# Patient Record
Sex: FEMALE
[De-identification: names, ages, dates, MRNs, and addresses within clinical notes are randomized per-mention and may not be internally consistent; named-entity substitution may affect disease eponyms.]

---

## 2017-07-29 ENCOUNTER — HOSPITAL ENCOUNTER (EMERGENCY)
Dept: HOSPITAL 14 - H.ER | Age: 37
Discharge: HOME | End: 2017-07-29
Payer: MEDICAID

## 2017-07-29 VITALS — SYSTOLIC BLOOD PRESSURE: 132 MMHG | DIASTOLIC BLOOD PRESSURE: 74 MMHG | RESPIRATION RATE: 20 BRPM | HEART RATE: 92 BPM

## 2017-07-29 VITALS — TEMPERATURE: 97.6 F | OXYGEN SATURATION: 100 %

## 2017-07-29 DIAGNOSIS — N76.0: Primary | ICD-10-CM

## 2017-07-29 NOTE — ED PDOC
HPI: Female  Pain


Chief Complaint (Nursing): Female Genitourinary


Chief Complaint (Provider): vaginal discharge


History Per: Patient


History/Exam Limitations: no limitations


Onset/Duration Of Symptoms: Days (7)


Current Symptoms Are (Timing): Still Present


Additional History Per: Patient


Additional Complaint(s): 





37 year old female presents with complaints of recurrent vaginal discharge that 

has fishy odor. Vaginal discharge has fishy odor. She states she has been 

treated multiple times for same. Denies any dysuria, hematuria, dyspareunia. 

Seen at 2 other urgent care facilities and given antibiotics with recurrence. 


She has two male partners and does not use protection because of a reaction to '

latex.' 


She denies hx of any STIs. 





Past Medical History


Vital Signs: 





 Last Vital Signs











Temp  97.6 F   17 13:43


 


Pulse  118 H  17 13:43


 


Resp  16   17 13:43


 


BP  137/92 H  17 13:43


 


Pulse Ox  100   17 13:43














- Medical History


PMH: No Chronic Diseases





- Surgical History


Surgical History: 


Other surgeries: cardiac ablation





- Family History


Family History: States: Unknown Family Hx





- Home Medications


Home Medications: 


 Ambulatory Orders











 Medication  Instructions  Recorded


 


Metronidazole [Flagyl] 500 mg PO Q12 7 Days 17














- Allergies


Allergies/Adverse Reactions: 


 Allergies











Allergy/AdvReac Type Severity Reaction Status Date / Time


 


No Known Allergies Allergy   Verified 10/16/14 17:46














Review of Systems


Constitutional: Negative for: Fever, Sweats


Eyes: Negative for: Vision Change


ENT: Negative for: Ear Discharge, Nose Discharge


Cardiovascular: Negative for: Chest Pain


Respiratory: Negative for: Cough, Shortness of Breath


Gastrointestinal: Negative for: Nausea, Vomiting, Abdominal Pain


Genitourinary Female: Positive for: Vaginal Discharge (fishy odor).  Negative 

for: Dysuria, Frequency, Incontinence, Pelvic Pain


Skin: Negative for: Rash





Physical Exam





- Physical Exam


Appears: Positive for: No Acute Distress


Gastrointestinal/Abdominal: Positive for: Soft.  Negative for: Tenderness, 

Distended


Pelvic Exam: Positive for: External Exam Normal, Speculum Exam Normal, Bimanual 

Exam Normal, No Cerv. Motion Tender, Discharge (small amount of white discharge

, no odor).  Negative for: No Masses, Active Bleeding


Extremity: Negative for: Pedal Edema


Neurologic/Psych: Positive for: Alert, CNs II-XII





- ECG


O2 Sat by Pulse Oximetry: 100





- Progress


ED Course And Treament: 





37 year old female with recurrent vaginal infections with fishy odor. 


-culture for BV/gonorrhea/chlamydia


-will give rocephin/azithro empirically





-d/c with rx for flagyl for BV.


-counseled sexual risk reduction, importance of using barrier contraception.


-Patient has appt at [facility] on 2017





case d/w Dr. Quintana.





Disposition





- Clinical Impression


Clinical Impression: 


 BV (bacterial vaginosis), STD (female)








- Patient ED Disposition


Is Patient to be Admitted: No





- Disposition


Referrals: 


[facility] [Outside]


Disposition: Routine/Home


Disposition Time: 16:12


Condition: GOOD


Additional Instructions: 


Patient will follow up at [facility] on 2017


Prescriptions: 


Metronidazole [Flagyl] 500 mg PO Q12 7 Days


Instructions:  Bacterial Vaginosis (ED), Sexually Transmitted Diseases (ED), 

Safe Sex (ED)


Forms:  [facility] (English)


Print Language: ENGLISH

## 2017-08-11 LAB
A VAGINAE DNA VAG QL NAA+PROBE: (no result)
BACTERIAL VAGINOSIS VAG-IMP: (no result)
COMPN STONE: (no result)
MEGA1 DNA VAG QL NAA+PROBE: (no result)

## 2017-11-26 ENCOUNTER — HOSPITAL ENCOUNTER (EMERGENCY)
Dept: HOSPITAL 14 - H.ER | Age: 37
Discharge: HOME | End: 2017-11-26
Payer: MEDICAID

## 2017-11-26 VITALS
TEMPERATURE: 96.7 F | DIASTOLIC BLOOD PRESSURE: 84 MMHG | OXYGEN SATURATION: 100 % | SYSTOLIC BLOOD PRESSURE: 136 MMHG | RESPIRATION RATE: 16 BRPM | HEART RATE: 90 BPM

## 2017-11-26 DIAGNOSIS — L03.312: Primary | ICD-10-CM

## 2017-11-26 LAB
BACTERIA #/AREA URNS HPF: (no result) /[HPF]
BILIRUB UR-MCNC: NEGATIVE MG/DL
COLOR UR: YELLOW
GLUCOSE UR STRIP-MCNC: (no result) MG/DL
KETONES UR STRIP-MCNC: NEGATIVE MG/DL
LEUKOCYTE ESTERASE UR-ACNC: (no result) LEU/UL
PH UR STRIP: 7 [PH] (ref 5–8)
PROT UR STRIP-MCNC: NEGATIVE MG/DL
RBC # UR STRIP: NEGATIVE /UL
RBC #/AREA URNS HPF: 2 /HPF (ref 0–3)
SP GR UR STRIP: 1.01 (ref 1–1.03)
UROBILINOGEN UR-MCNC: (no result) MG/DL (ref 0.2–1)
WBC #/AREA URNS HPF: 1 /HPF (ref 0–5)

## 2017-11-26 NOTE — ED PDOC
HPI: General Adult


Time Seen by Provider: 17 16:49


Chief Complaint (Nursing): Abnormal Skin Integrity


Chief Complaint (Provider): Back SKin


History Per: Patient


Onset/Duration Of Symptoms: Days


Additional Complaint(s): 








Haylee Coleman is a 37 year old female with a past medical history of heart 

ablation and 2 caesarians sections who presents to the Emergency Department 

complaining of right flank pain since yesterday. Patient states it hurts when 

she stands up and moves around. States she had a Depo shot (2nd time) 

yesterday. Localized redness on her abdomen. Denies allergies to medications





PMD: [place]





Past Medical History


Reviewed: Historical Data, Nursing Documentation, Vital Signs


Vital Signs: 


 Last Vital Signs











Temp  96.7 F L  17 16:45


 


Pulse  90   17 16:45


 


Resp  16   17 16:45


 


BP  136/84   17 16:45


 


Pulse Ox  100   17 17:14














- Medical History


Other PMH: Heart Ablation 





- Surgical History


Surgical History:  (x 2)





- Family History


Family History: States: Unknown Family Hx





- Home Medications


Home Medications: 


 Ambulatory Orders











 Medication  Instructions  Recorded


 


Metronidazole [Flagyl] 500 mg PO Q12 7 Days  tablet 17


 


Cephalexin [Keflex] 500 mg PO QID #28 capsule 17


 


Naproxen 1 tab PO Q12 PRN #14 tab 17


 


diaZEpam [Valium] 5 mg PO Q6 PRN #2 tab 17














- Allergies


Allergies/Adverse Reactions: 


 Allergies











Allergy/AdvReac Type Severity Reaction Status Date / Time


 


No Known Allergies Allergy   Verified 10/16/14 17:46














Review of Systems


ROS Statement: Except As Marked, All Systems Reviewed And Found Negative


Musculoskeletal: Positive for: Other (Right Flank Pain)





Physical Exam





- Reviewed


Nursing Documentation Reviewed: Yes


Vital Signs Reviewed: Yes





- Physical Exam


Appears: Positive for: Non-toxic


Head Exam: Positive for: ATRAUMATIC, NORMAL INSPECTION, NORMOCEPHALIC


Skin: Negative for: Normal Color (Localized erythema on abdomen )


Eye Exam: Positive for: Normal appearance


Neck: Positive for: Normal


Respiratory: Positive for: Normal Breath Sounds.  Negative for: Respiratory 

Distress


Gastrointestinal/Abdominal: Positive for: Other (2.5 cm in diameter region 

erythema noted right lower abdomen).  Negative for: Tenderness


Back: Positive for: Other (Right sacral paralumbar tenderness).  Negative for: 

L CVA Tenderness, R CVA Tenderness


Extremity: Positive for: Normal ROM


Neurologic/Psych: Positive for: Alert, Oriented





- Laboratory Results


Urine Pregnancy POC: Negative


Urine dip results: Negative for: Leukocyte Esterase, Blood, Nitrate, Ketones, 

Glucose, Bilirubin, Protein





- ECG


O2 Sat by Pulse Oximetry: 100 (RA)


Pulse Ox Interpretation: Normal





Medical Decision Making


Medical Decision Making: 





Time: 16:53


Plan:


- Naproxen 500 mg PO


- Urine Culture


- Urinalysis











--------------------------------------------------------------------------------

-----------------


Scribe Attestation:


Documented by Aden Young, acting as a scribe for Madonna Obregon PA-C





Provider Scribe Attestation:


All medical record entries made by the Scribe were at my direction and 

personally dictated by me. I have reviewed the chart and agree that the record 

accurately reflects my personal performance of the history, physical exam, 

medical decision making, and the department course for this patient. I have 

also personally directed, reviewed, and agree with the discharge instructions 

and disposition.





Disposition





- Clinical Impression


Clinical Impression: 


 Cellulitis








- Patient ED Disposition


Is Patient to be Admitted: No





- Disposition


Referrals: 


formerly [facility] [Outside]


Disposition: Routine/Home


Disposition Time: 17:18


Condition: FAIR


Prescriptions: 


Cephalexin [Keflex] 500 mg PO QID #28 capsule


diaZEpam [Valium] 5 mg PO Q6 PRN #2 tab


 PRN Reason: Pain, Moderate (4-7)


Naproxen 1 tab PO Q12 PRN #14 tab


 PRN Reason: Pain, Moderate (4-7)


Instructions:  Cellulitis (ED), Acute Low Back Pain (DC)


Forms:  [facility] (English), [facility] ED School/Work Excuse

## 2017-12-16 ENCOUNTER — HOSPITAL ENCOUNTER (EMERGENCY)
Dept: HOSPITAL 14 - H.ER | Age: 37
Discharge: HOME | End: 2017-12-16
Payer: MEDICAID

## 2017-12-16 VITALS
HEART RATE: 110 BPM | DIASTOLIC BLOOD PRESSURE: 79 MMHG | SYSTOLIC BLOOD PRESSURE: 128 MMHG | OXYGEN SATURATION: 100 % | TEMPERATURE: 98 F | RESPIRATION RATE: 18 BRPM

## 2017-12-16 DIAGNOSIS — L03.011: Primary | ICD-10-CM

## 2017-12-16 NOTE — ED PDOC
HPI: General Adult


Time Seen by Provider: 17 04:08


Chief Complaint (Nursing): Finger,Hand,&Wrist


History Per: Patient


Additional Complaint(s): 





Pt. states last night she developed atraumatic R middle finger pain. Admits to 

cutting her nails "very short." Denies fever, discharge. 





Past Medical History


Reviewed: Historical Data, Nursing Documentation, Vital Signs


Vital Signs: 


 Last Vital Signs











Temp  98 F   17 04:16


 


Pulse  110 H  17 04:16


 


Resp  18   17 04:16


 


BP  128/79   17 04:16


 


Pulse Ox  100   17 04:34














- Surgical History


Surgical History:  (x 2)





- Family History


Family History: States: No Known Family Hx





- Home Medications


Home Medications: 


 Ambulatory Orders











 Medication  Instructions  Recorded


 


Metronidazole [Flagyl] 500 mg PO Q12 7 Days  tablet 17


 


Cephalexin [Keflex] 500 mg PO QID #28 capsule 17


 


Naproxen 1 tab PO Q12 PRN #14 tab 17


 


diaZEpam [Valium] 5 mg PO Q6 PRN #2 tab 17


 


Cephalexin [cephalexin] 500 mg PO Q6 #28 cap 17














- Allergies


Allergies/Adverse Reactions: 


 Allergies











Allergy/AdvReac Type Severity Reaction Status Date / Time


 


No Known Allergies Allergy   Verified 17 04:16














Review of Systems


ROS Statement: Except As Marked, All Systems Reviewed And Found Negative





Physical Exam





- Physical Exam


Appears: Positive for: Well, Non-toxic, No Acute Distress


Skin: Positive for: Normal Color, Warm.  Negative for: Rash


Pulses-Radial (R): 2+


Extremity: Positive for: Normal ROM, Other (R 3rd digit with tenderness and 

fluctuance on nail margin with nail intact and cap refill < 2 seconds)





- ECG


O2 Sat by Pulse Oximetry: 100





Procedures





- Time-Out


Type of Procedure: Incision and drainage


Site of Procedure: R 3rd digit


Correct Patient (with visual ID + MR# on ID Band): Yes


Correct Procedure: Yes


Correct Site Marked: Yes





- Incision and Drainage


Blade Size: 11


I & D Procedure: betadine prep


Progress: 





Small amount of purulent material was expressed out of finger. Bacitracin 

ointment applied. DSD applied. 





Disposition





- Clinical Impression


Clinical Impression: 


 Paronychia








- Patient ED Disposition


Is Patient to be Admitted: No





- Disposition


Disposition: Routine/Home


Disposition Time: 04:52


Condition: STABLE


Prescriptions: 


Cephalexin [cephalexin] 500 mg PO Q6 #28 cap


Instructions:  Paronychia (ED)


Forms:  [facility] (English)

## 2018-06-10 ENCOUNTER — HOSPITAL ENCOUNTER (EMERGENCY)
Dept: HOSPITAL 14 - H.ER | Age: 38
LOS: 1 days | Discharge: HOME | End: 2018-06-11
Payer: MEDICAID
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
## 2018-06-10 DIAGNOSIS — S50.862A: Primary | ICD-10-CM

## 2018-06-10 DIAGNOSIS — L02.412: ICD-10-CM
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
## 2018-06-11 VITALS
DIASTOLIC BLOOD PRESSURE: 66 MMHG | SYSTOLIC BLOOD PRESSURE: 111 MMHG | TEMPERATURE: 98 F | RESPIRATION RATE: 18 BRPM | HEART RATE: 78 BPM

## 2018-06-11 VITALS — OXYGEN SATURATION: 99 %

## 2018-06-11 NOTE — ED PDOC
HPI: General Adult


Time Seen by Provider: 06/10/18 23:58


Chief Complaint (Nursing): Bite


Chief Complaint (Provider): abscess


History Per: Patient (37 y/o female here with left axillary pain noted after 

mosquito bite.  Denies any fevers/chills.  Believes td up to date.)





Past Medical History


Reviewed: Historical Data, Nursing Documentation, Vital Signs


Vital Signs: 


 Last Vital Signs











Temp  98 F   18 00:44


 


Pulse  78   18 00:44


 


Resp  18   18 00:44


 


BP  111/66   18 00:44


 


Pulse Ox  100   18 00:44














- Surgical History


Surgical History:  (x 2)





- Family History


Family History: States: Unknown Family Hx





- Home Medications


Home Medications: 


 Ambulatory Orders











 Medication  Instructions  Recorded


 


Metronidazole [Flagyl] 500 mg PO Q12 7 Days  tablet 17


 


Cephalexin [Keflex] 500 mg PO QID #28 capsule 17


 


Naproxen 1 tab PO Q12 PRN #14 tab 17


 


diaZEpam [Valium] 5 mg PO Q6 PRN #2 tab 17


 


Cephalexin [cephalexin] 500 mg PO Q6 #28 cap 17


 


Cephalexin [Keflex] 500 mg PO QID #28 capsule 18


 


Sulfamethoxazole/Trimethoprim 2 tab PO BID #28 tab 18





[Bactrim  mg-160 mg]  














- Allergies


Allergies/Adverse Reactions: 


 Allergies











Allergy/AdvReac Type Severity Reaction Status Date / Time


 


No Known Allergies Allergy   Verified 06/10/18 23:25














Review of Systems


ROS Statement: Except As Marked, All Systems Reviewed And Found Negative





Physical Exam





- Reviewed


Nursing Documentation Reviewed: Yes


Vital Signs Reviewed: Yes





- Physical Exam


Appears: Positive for: Well, Non-toxic, No Acute Distress


Head Exam: Positive for: ATRAUMATIC, NORMAL INSPECTION, NORMOCEPHALIC


Skin: Positive for: Warm.  Negative for: Normal Color (5 mm swelling noted 

indurated surrounded by region of erythema 2 cm in diameter.)


Eye Exam: Positive for: EOMI, Normal appearance, PERRL


ENT: Positive for: Normal ENT Inspection


Neck: Positive for: Normal, Painless ROM


Cardiovascular/Chest: Positive for: Regular Rate, Rhythm


Respiratory: Positive for: CNT, Normal Breath Sounds


Gastrointestinal/Abdominal: Positive for: Normal Exam, Soft


Back: Positive for: Normal Inspection


Extremity: Positive for: Normal ROM


Neurologic/Psych: Positive for: Alert, Oriented





- ECG


O2 Sat by Pulse Oximetry: 99





- Progress


ED Course And Treament: 





VERBAL CONSENT PRIOR TO PROCEDURE





ATTEMPT MADE TO ASPIRATE WITH 18 GUAGE NEEDLE WITHOUT SUCCESS.





Patient offered td; does not want





Keflex 500mg x 1 dose





Bactrim DS 2 tab x 1 dose





Disposition





- Clinical Impression


Clinical Impression: 


 Insect bite - wound, Abscess








- Patient ED Disposition


Is Patient to be Admitted: No





- Disposition


Disposition: Routine/Home


Disposition Time: 00:01


Condition: FAIR


Additional Instructions: 


RETURN IN 48 HOURS FOR RE-EVALUATION


Prescriptions: 


Cephalexin [Keflex] 500 mg PO QID #28 capsule


Sulfamethoxazole/Trimethoprim [Bactrim  mg-160 mg] 2 tab PO BID #28 tab


Instructions:  Insect Bites and Stings, Cellulitis (Skin Infection), Adult (DC)

## 2018-06-13 ENCOUNTER — HOSPITAL ENCOUNTER (EMERGENCY)
Dept: HOSPITAL 14 - H.ER | Age: 38
Discharge: HOME | End: 2018-06-13
Payer: MEDICAID

## 2018-06-13 VITALS
DIASTOLIC BLOOD PRESSURE: 68 MMHG | HEART RATE: 78 BPM | OXYGEN SATURATION: 100 % | RESPIRATION RATE: 18 BRPM | TEMPERATURE: 98 F | SYSTOLIC BLOOD PRESSURE: 122 MMHG

## 2018-06-13 DIAGNOSIS — L03.114: Primary | ICD-10-CM

## 2018-06-13 LAB
ALBUMIN SERPL-MCNC: 4.5 G/DL (ref 3.5–5)
ALBUMIN/GLOB SERPL: 1.2 {RATIO} (ref 1–2.1)
ALT SERPL-CCNC: 18 U/L (ref 9–52)
AST SERPL-CCNC: 40 U/L (ref 14–36)
BASOPHILS # BLD AUTO: 0 K/UL (ref 0–0.2)
BASOPHILS NFR BLD: 0.3 % (ref 0–2)
BUN SERPL-MCNC: 8 MG/DL (ref 7–17)
CALCIUM SERPL-MCNC: 9.4 MG/DL (ref 8.4–10.2)
EOSINOPHIL # BLD AUTO: 0 K/UL (ref 0–0.7)
EOSINOPHIL NFR BLD: 0.4 % (ref 0–4)
ERYTHROCYTE [DISTWIDTH] IN BLOOD BY AUTOMATED COUNT: 13.2 % (ref 11.5–14.5)
GFR NON-AFRICAN AMERICAN: > 60
HGB BLD-MCNC: 14 G/DL (ref 12–16)
LYMPHOCYTES # BLD AUTO: 1.5 K/UL (ref 1–4.3)
LYMPHOCYTES NFR BLD AUTO: 18.8 % (ref 20–40)
MCH RBC QN AUTO: 31.8 PG (ref 27–31)
MCHC RBC AUTO-ENTMCNC: 34.3 G/DL (ref 33–37)
MCV RBC AUTO: 92.6 FL (ref 81–99)
MONOCYTES # BLD: 0.6 K/UL (ref 0–0.8)
MONOCYTES NFR BLD: 7.1 % (ref 0–10)
NEUTROPHILS # BLD: 6 K/UL (ref 1.8–7)
NEUTROPHILS NFR BLD AUTO: 73.4 % (ref 50–75)
NRBC BLD AUTO-RTO: 0 % (ref 0–0)
PLATELET # BLD: 200 K/UL (ref 130–400)
PMV BLD AUTO: 9.7 FL (ref 7.2–11.7)
RBC # BLD AUTO: 4.41 MIL/UL (ref 3.8–5.2)
WBC # BLD AUTO: 8.1 K/UL (ref 4.8–10.8)

## 2018-06-13 PROCEDURE — 99282 EMERGENCY DEPT VISIT SF MDM: CPT

## 2018-06-13 PROCEDURE — 80053 COMPREHEN METABOLIC PANEL: CPT

## 2018-06-13 PROCEDURE — 85025 COMPLETE CBC W/AUTO DIFF WBC: CPT

## 2018-06-13 PROCEDURE — 96372 THER/PROPH/DIAG INJ SC/IM: CPT

## 2018-06-13 NOTE — ED PDOC
HPI: Skin/Bite Injury


Time Seen by Provider: 18 20:45


Chief Complaint (Nursing): Abnormal Skin Integrity


Chief Complaint (Provider): abscess, open


History Per: Patient


History/Exam Limitations: no limitations


Onset/Duration Of Symptoms: Days


Current Symptoms Are (Timing): Still Present


Quality Of Symptoms: Draining


Additional Complaint(s): 


38 year old female presents to the ED for an evaluation of bug bite on left 

arm. Patient visited the ED 2 days ago and was advised to do warm compress and 

provided antibiotics. Patient has not started her antibiotics. Reports drainage 

and itchiness on the bite. 


PMD: Real Bridges








Past Medical History


Reviewed: Historical Data, Nursing Documentation, Vital Signs


Vital Signs: 


 Last Vital Signs











Temp  98.5 F   18 20:16


 


Pulse  92 H  18 20:16


 


Resp  16   18 20:16


 


BP  126/72   18 20:16


 


Pulse Ox  99   18 21:03














- Medical History


PMH: No Chronic Diseases





- Surgical History


Surgical History:  (x 2)





- Family History


Family History: States: Unknown Family Hx





- Home Medications


Home Medications: 


 Ambulatory Orders











 Medication  Instructions  Recorded


 


Metronidazole [Flagyl] 500 mg PO Q12 7 Days  tablet 17


 


Cephalexin [Keflex] 500 mg PO QID #28 capsule 17


 


Naproxen 1 tab PO Q12 PRN #14 tab 17


 


diaZEpam [Valium] 5 mg PO Q6 PRN #2 tab 17


 


Cephalexin [cephalexin] 500 mg PO Q6 #28 cap 17


 


Cephalexin [Keflex] 500 mg PO QID #28 capsule 18


 


Sulfamethoxazole/Trimethoprim 2 tab PO BID #28 tab 18





[Bactrim  mg-160 mg]  














- Allergies


Allergies/Adverse Reactions: 


 Allergies











Allergy/AdvReac Type Severity Reaction Status Date / Time


 


No Known Allergies Allergy   Verified 06/10/18 23:25














Review of Systems


ROS Statement: Except As Marked, All Systems Reviewed And Found Negative


Skin: Positive for: Other (abscess on left flank)





Physical Exam





- Reviewed


Nursing Documentation Reviewed: Yes


Vital Signs Reviewed: Yes





- Physical Exam


Appears: Positive for: Well, Non-toxic, No Acute Distress


Head Exam: Positive for: ATRAUMATIC, NORMAL INSPECTION, NORMOCEPHALIC


Skin: Positive for: Normal Color (cellulitis measuring 4cm x 4cm on left flank 

with area of open draining abscess), Warm, Dry


Cardiovascular/Chest: Positive for: Regular Rate, Rhythm


Respiratory: Positive for: Normal Breath Sounds


Pulses-Carotid (L): 2+


Pulses-Carotid (R): 2+


Pulses-Radial (L): 2+


Pulses-Radial (R): 2+


Extremity: Positive for: Normal ROM, Other (left side cellulitis)


Neurologic/Psych: Positive for: Alert, Oriented (x3).  Negative for: Motor/

Sensory Deficits





- Laboratory Results


Result Diagrams: 


 18 21:36








- ECG


O2 Sat by Pulse Oximetry: 99 (RA)


Pulse Ox Interpretation: Normal





Medical Decision Making


Medical Decision Making: 


Time: 


Initial impression: cellulitis 


Initial plan:


--CMP


--CBC w/ Differential 


--Decadron 10mg 


-----------------------------------------------------------------------


Scribe Attestation:


Documented by Sushil Kamara, acting as a scribe for Deejay Gómez PA-C.


Provider Scribe Attestation:


All medical record entries made by the Scribe were at my direction and 

personally dictated by me. I have reviewed the chart and agree that the record 

accurately reflects my personal performance of the history, physical exam, 

medical decision making, and the department course for this patient. I have 

also personally directed, reviewed, and agree with the discharge instructions 

and disposition.








Disposition





- Clinical Impression


Clinical Impression: 


 Abscess, Cellulitis








- Patient ED Disposition


Is Patient to be Admitted: No


Doctor Will See Patient In The: Office


Counseled Patient/Family Regarding: Diagnosis, Need For Followup, Rx Given





- Disposition


Referrals: 


[facility] [Outside]


Disposition: Routine/Home


Disposition Time: 21:49


Condition: STABLE


Additional Instructions: 


warm compresses over region of cellulitis 6x a day for 20-25mins each time


continue with the antibiotics (keflex and bactrim) as prescribed


return to ED if red streaking appears above the lesion


Instructions:  Cellulitis and Erysipelas (Skin Infections), Cellulitis (Skin 

Infection), Adult (DC), Skin Abscess


Forms:  [facility] (English)

## 2018-08-07 ENCOUNTER — HOSPITAL ENCOUNTER (EMERGENCY)
Dept: HOSPITAL 14 - H.ER | Age: 38
Discharge: HOME | End: 2018-08-07
Payer: MEDICAID

## 2018-08-07 VITALS
HEART RATE: 90 BPM | RESPIRATION RATE: 20 BRPM | DIASTOLIC BLOOD PRESSURE: 78 MMHG | TEMPERATURE: 98.2 F | SYSTOLIC BLOOD PRESSURE: 121 MMHG | OXYGEN SATURATION: 99 %

## 2018-08-07 DIAGNOSIS — L03.019: Primary | ICD-10-CM

## 2018-08-07 NOTE — ED PDOC
HPI: General Adult


Time Seen by Provider: 18 21:43


Chief Complaint (Nursing): Upper Extremity Problem/Injury


Chief Complaint (Provider): finger swelling


History Per: Patient


History/Exam Limitations: no limitations


Additional Complaint(s): 


37 y/o presents to the ED complaining of pain and swelling to the right 4th 

digit. Patient has been taking ibuprofen which has helped somewhat. Patient 

denies active drainage, fever or chills.  





PMD: [facility]. 





Past Medical History


Reviewed: Historical Data, Nursing Documentation, Vital Signs


Vital Signs: 


 Last Vital Signs











Temp  98.2 F   18 21:42


 


Pulse  90   18 21:42


 


Resp  20   18 21:42


 


BP  121/78   18 21:42


 


Pulse Ox  99   18 22:02














- Medical History


PMH: No Chronic Diseases





- Surgical History


Surgical History:  (x 2)


Other surgeries: Cardiac ablation





- Family History


Family History: States: No Known Family Hx





- Living Arrangements


Living Arrangements: With Family





- Social History


Current smoker - smoking cessation education provided: No


Alcohol: None


Drugs: Denies





- Home Medications


Home Medications: 


 Ambulatory Orders











 Medication  Instructions  Recorded


 


Metronidazole [Flagyl] 500 mg PO Q12 7 Days  tablet 17


 


Cephalexin [Keflex] 500 mg PO QID #28 capsule 17


 


Naproxen 1 tab PO Q12 PRN #14 tab 17


 


diaZEpam [Valium] 5 mg PO Q6 PRN #2 tab 17


 


Cephalexin [cephalexin] 500 mg PO Q6 #28 cap 17


 


Cephalexin [Keflex] 500 mg PO QID #28 capsule 18


 


Sulfamethoxazole/Trimethoprim 2 tab PO BID #28 tab 18





[Bactrim  mg-160 mg]  


 


Clindamycin [Cleocin] 300 mg PO QID #28 cap 18


 


Ibuprofen [Motrin Tab] 800 mg PO Q8 PRN #20 tab 18














- Allergies


Allergies/Adverse Reactions: 


 Allergies











Allergy/AdvReac Type Severity Reaction Status Date / Time


 


No Known Allergies Allergy   Verified 06/10/18 23:25














Review of Systems


ROS Statement: Except As Marked, All Systems Reviewed And Found Negative


Constitutional: Negative for: Fever, Chills


Musculoskeletal: Positive for: Hand Pain (Right 4th digit pain and swelling)





Physical Exam





- Reviewed


Nursing Documentation Reviewed: Yes


Vital Signs Reviewed: Yes





- Physical Exam


Appears: Positive for: Well, Non-toxic, No Acute Distress


Head Exam: Positive for: ATRAUMATIC


Skin: Positive for: Normal Color.  Negative for: Rash


Eye Exam: Positive for: Normal appearance


Cardiovascular/Chest: Positive for: Regular Rate, Rhythm


Respiratory: Positive for: Normal Breath Sounds


Back: Positive for: Normal Inspection


Extremity: Positive for: Normal ROM (of affected digit), Other (Non-fluctuant 

paronychia noted to the right fourth digit eponychial fold, no active drainage, 

fingernail intact with no lifting or discoloration).  Negative for: Deformity


Neurologic/Psych: Positive for: Alert, Oriented.  Negative for: Motor/Sensory 

Deficits





- Laboratory Results


Urine Pregnancy POC: Negative (patient declined test, states she is certain she 

is not pregnant)





- ECG


O2 Sat by Pulse Oximetry: 99 (RA)


Pulse Ox Interpretation: Normal





Medical Decision Making


Medical Decision Making: 


Time: 


Impression: Right 4th digit paronychia





Plan:


-- Clindamycin 300 mg PO


-- Motrin 600 mg PO





Paronychia demonstrates no fluctuance, there is no indication at this time for 

incision and drainage. Patient given initial dose of clindamycin in ED. 

Prescriptions for Motrin and clindamycin provided. Advised warm compresses with 

Epsom salts as often as possible and wound recheck in 2-3 days.





_______________________________________________________________________________


Scribe Attestation:


Documented by Kathy Hurt, acting as a scribe for Hetal Esparza PA-C.





Provider Scribe Attestation:


All medical record entries made by the Scribe were at my direction and 

personally dictated by me. I have reviewed the chart and agree that the record 

accurately reflects my personal performance of the history, physical exam, 

medical decision making, and the department course for this patient. I have 

also personally directed, reviewed, and agree with the discharge instructions 

and disposition.





Disposition





- Clinical Impression


Clinical Impression: 


 Paronychia








- Patient ED Disposition


Is Patient to be Admitted: No


Counseled Patient/Family Regarding: Diagnosis, Need For Followup, Rx Given





- Disposition


Referrals: 


[facility] [Outside]


Disposition: Routine/Home


Disposition Time: 22:15


Condition: STABLE


Additional Instructions: 


Apply warm soaks with Epsom salts to affected area as often as possible. Take 

prescription meds as directed. Return to emergency room Friday for re-

evaluation or sooner if acutely worse. 


Prescriptions: 


Clindamycin [Cleocin] 300 mg PO QID #28 cap


Ibuprofen [Motrin Tab] 800 mg PO Q8 PRN #20 tab


 PRN Reason: Pain, Moderate (4-7)


Instructions:  Paronychia (DC)


Forms:  [facility] (English)

## 2018-12-27 ENCOUNTER — HOSPITAL ENCOUNTER (EMERGENCY)
Dept: HOSPITAL 14 - H.ER | Age: 38
Discharge: HOME | End: 2018-12-27
Payer: MEDICAID

## 2018-12-27 VITALS
OXYGEN SATURATION: 100 % | DIASTOLIC BLOOD PRESSURE: 77 MMHG | TEMPERATURE: 98.2 F | SYSTOLIC BLOOD PRESSURE: 124 MMHG | HEART RATE: 88 BPM | RESPIRATION RATE: 16 BRPM

## 2018-12-27 DIAGNOSIS — L03.116: Primary | ICD-10-CM

## 2018-12-27 NOTE — ED PDOC
HPI: Skin/Bite Injury


Time Seen by Provider: 18 19:21


Chief Complaint (Nursing): Abnormal Skin Integrity


Chief Complaint (Provider): Abnormal Skin Integrity


History Per: Patient


History/Exam Limitations: no limitations


Current Symptoms Are (Timing): Still Present


Quality Of Symptoms: Swollen


Additional Complaint(s): 


39 y/o female presents to the ED with a bed bug bite to the left thigh onset 2 

days ago. Patient reports bite became itchy and she began to scratch it. Soon 

after the bite became swollen and popped with pus like fluid coming out. It 

became red prompting her visit. LMP one week ago. Otherwise, patient denies any 

other symptoms.





PMD: non provided








Past Medical History


Reviewed: Historical Data, Nursing Documentation, Vital Signs


Vital Signs: 





                                Last Vital Signs











Temp  98.2 F   18 18:37


 


Pulse  88   18 18:37


 


Resp  16   18 18:37


 


BP  124/77   18 18:37


 


Pulse Ox  100   18 18:37














- Medical History


PMH: 


   Denies: Chronic Kidney Disease





- Surgical History


Surgical History:  (x 2)





- Family History


Family History: States: Unknown Family Hx





- Home Medications


Home Medications: 


                                Ambulatory Orders











 Medication  Instructions  Recorded


 


Metronidazole [Flagyl] 500 mg PO Q12 7 Days  tablet 17


 


Cephalexin [Keflex] 500 mg PO QID #28 capsule 17


 


Naproxen 1 tab PO Q12 PRN #14 tab 17


 


diaZEpam [Valium] 5 mg PO Q6 PRN #2 tab 17


 


Cephalexin [cephalexin] 500 mg PO Q6 #28 cap 17


 


Cephalexin [Keflex] 500 mg PO QID #28 capsule 18


 


Sulfamethoxazole/Trimethoprim 2 tab PO BID #28 tab 18





[Bactrim  mg-160 mg]  


 


Clindamycin [Cleocin] 300 mg PO QID #28 cap 18


 


Ibuprofen [Motrin Tab] 800 mg PO Q8 PRN #20 tab 18


 


Doxycycline Hyclate 100 mg PO BID #14 capsule 18














- Allergies


Allergies/Adverse Reactions: 


                                    Allergies











Allergy/AdvReac Type Severity Reaction Status Date / Time


 


No Known Allergies Allergy   Verified 18 18:37














Review of Systems


ROS Statement: Except As Marked, All Systems Reviewed And Found Negative


Skin: Positive for: Other (swelling and redness to the left thigh)





Physical Exam





- Reviewed


Nursing Documentation Reviewed: Yes


Vital Signs Reviewed: Yes





- Physical Exam


Appears: Positive for: Well, No Acute Distress


Head Exam: Positive for: ATRAUMATIC, NORMAL INSPECTION, NORMOCEPHALIC


Eye Exam: Positive for: Normal appearance, EOMI, PERRL


Extremity: Positive for: Swelling (3 cm area of erythmea with small central 

swelling ).  Negative for: Other ( induration, fluctuance, lymphangitic 

streaking)


Neurologic/Psych: Positive for: Alert, Oriented (x3).  Negative for: 

Motor/Sensory Deficits





- ECG


O2 Sat by Pulse Oximetry: 100





Medical Decision Making


Medical Decision Making: 


Time:19:39





- Doryx 100 mg PO





Patient requires no further treatment and will be discharged with a prescription

of Doryx. 





------------------------------------------

------------------------------------------------------------


Scribe Attestation:


Documented by Tiffanie Ruano , acting as a scribe for Maranda King.


 


Provider Scribe Attestation:


All medical record entries made by the Scribe were at my direction and 

personally dictated by me. I have reviewed the chart and agree that the record 

accurately reflects my personal performance of the history, physical exam, 

medical decision making, and the department course for this patient. I have also

personally directed, reviewed, and agree with the discharge instructions and 

disposition.


 








Disposition





- Clinical Impression


Clinical Impression: 


 Cellulitis








- Patient ED Disposition


Is Patient to be Admitted: No





- Disposition


Disposition Time: 19:50


Condition: STABLE


Prescriptions: 


Doxycycline Hyclate 100 mg PO BID #14 capsule


Instructions:  Cellulitis (Skin Infection), Adult (DC)


Forms:  [facility] (English)